# Patient Record
Sex: FEMALE | Race: WHITE
[De-identification: names, ages, dates, MRNs, and addresses within clinical notes are randomized per-mention and may not be internally consistent; named-entity substitution may affect disease eponyms.]

---

## 2020-06-12 ENCOUNTER — HOSPITAL ENCOUNTER (EMERGENCY)
Dept: HOSPITAL 11 - JP.ED | Age: 45
Discharge: HOME | End: 2020-06-12
Payer: COMMERCIAL

## 2020-06-12 DIAGNOSIS — R10.12: Primary | ICD-10-CM

## 2020-06-12 DIAGNOSIS — Z88.6: ICD-10-CM

## 2020-06-12 DIAGNOSIS — Z88.8: ICD-10-CM

## 2020-06-12 DIAGNOSIS — Z88.5: ICD-10-CM

## 2020-06-12 DIAGNOSIS — E03.9: ICD-10-CM

## 2020-06-12 DIAGNOSIS — M54.6: ICD-10-CM

## 2020-06-12 DIAGNOSIS — F41.9: ICD-10-CM

## 2020-06-12 PROCEDURE — 99284 EMERGENCY DEPT VISIT MOD MDM: CPT

## 2020-06-12 PROCEDURE — 36415 COLL VENOUS BLD VENIPUNCTURE: CPT

## 2020-06-12 PROCEDURE — 96374 THER/PROPH/DIAG INJ IV PUSH: CPT

## 2020-06-12 PROCEDURE — 80053 COMPREHEN METABOLIC PANEL: CPT

## 2020-06-12 PROCEDURE — 85025 COMPLETE CBC W/AUTO DIFF WBC: CPT

## 2020-06-12 PROCEDURE — 83605 ASSAY OF LACTIC ACID: CPT

## 2020-06-12 PROCEDURE — 84484 ASSAY OF TROPONIN QUANT: CPT

## 2020-06-12 PROCEDURE — 83690 ASSAY OF LIPASE: CPT

## 2020-06-12 PROCEDURE — 81025 URINE PREGNANCY TEST: CPT

## 2020-06-12 PROCEDURE — 96375 TX/PRO/DX INJ NEW DRUG ADDON: CPT

## 2020-06-12 PROCEDURE — 74177 CT ABD & PELVIS W/CONTRAST: CPT

## 2020-06-12 PROCEDURE — 96361 HYDRATE IV INFUSION ADD-ON: CPT

## 2020-06-12 PROCEDURE — 81001 URINALYSIS AUTO W/SCOPE: CPT

## 2020-06-12 PROCEDURE — 96376 TX/PRO/DX INJ SAME DRUG ADON: CPT

## 2020-06-12 RX ADMIN — Medication PRN ML: at 20:33

## 2020-06-12 RX ADMIN — Medication PRN ML: at 19:02

## 2020-06-12 RX ADMIN — Medication PRN ML: at 19:21

## 2020-06-12 NOTE — EDM.PDOC
ED HPI GENERAL MEDICAL PROBLEM





- General


Chief Complaint: Abdominal Pain


Stated Complaint: LEFT SIDED PAIN


Time Seen by Provider: 20 18:34


Source of Information: Reports: Patient, RN Notes Reviewed


History Limitations: Reports: No Limitations





- History of Present Illness


INITIAL COMMENTS - FREE TEXT/NARRATIVE: 





45-year-old female presents emergency department with a complaint of left upper 

quadrant pain, she states is been going on for 3 days she does have a lot of 

back pain with this does have a history of pancreatitis as well as Sjogren's 

syndrome also history of perforated ulcer with multiple complications, she has 

not had any fevers has had nausea and vomiting no shortness of breath or chest 

pain no problems with bowel movements


  ** back


Pain Score (Numeric/FACES): 7





- Related Data


 Allergies











Allergy/AdvReac Type Severity Reaction Status Date / Time


 


acetaminophen Allergy  Nausea Verified 20 17:46





[From Tylenol-Codeine #3]     


 


codeine Allergy  Nausea Verified 20 17:46





[From Tylenol-Codeine #3]     


 


haloperidol [From Haldol] Allergy  Anxiety Verified 20 17:46


 


metoclopramide [From Reglan] Allergy  Anxiety Verified 20 17:46











Home Meds: 


 Home Meds





Cyanocobalamin (Vitamin B-12) [Cyanocobalamin Injection] 1,000 mcg IM 

ASDIRECTED 20 [History]


Furosemide 40 mg PO DAILY 20 [History]


Hydrocodone/Acetaminophen [Hydrocodon-Acetaminophn ] 1 tab PO QID PRN  [History]


QUEtiapine [SEROquel] 400 mg PO BEDTIME 20 [History]


Scopolamine 1 each TD ASDIRECTED 20 [History]











Past Medical History


HEENT History: Reports: Other (See Below)


Other HEENT History: blind right eye


Gastrointestinal History: Reports: Other (See Below)


Other Gastrointestinal History: bowel perforation, recurrent pancreatitis


Genitourinary History: Reports: UTI, Recurrent


OB/GYN History: Reports: Pregnancy


Musculoskeletal History: Reports: Other (See Below)


Other Musculoskeletal History: Sjogren's


Psychiatric History: Reports: Anxiety, PTSD


Endocrine/Metabolic History: Reports: Hypothyroidism


Immunologic History: Reports: Other (See Below)


Other Immunologic History: Sjogren's





- Past Surgical History


GI Surgical History: Reports: Bariatric Procedure, Cholecystectomy, Esophageal 

Dilatation


Female  Surgical History: Reports:  Section





Social & Family History





- Tobacco Use


Smoking Status *Q: Never Smoker





- Recreational Drug Use


Recreational Drug Use: No





ED ROS GENERAL





- Review of Systems


Review Of Systems: See Below


Constitutional: Denies: Fever, Chills


HEENT: Reports: No Symptoms


Respiratory: Reports: No Symptoms


Cardiovascular: Reports: No Symptoms


GI/Abdominal: Reports: Abdominal Pain, Nausea, Vomiting.  Denies: Constipation, 

Diarrhea


: Reports: No Symptoms


Musculoskeletal: Reports: No Symptoms





ED EXAM, GI/ABD





- Physical Exam


Exam: See Below


Exam Limited By: No Limitations


General Appearance: Alert, WD/WN, No Apparent Distress


Neck: Normal Inspection, Supple, Non-Tender, Full Range of Motion


Respiratory/Chest: No Respiratory Distress, Lungs Clear, Normal Breath Sounds, 

No Accessory Muscle Use, Chest Non-Tender


Cardiovascular: Regular Rate, Rhythm, No Murmur


GI/Abdominal Exam: Soft, No Organomegaly, No Distention, No Abnormal Bruit, 

Tender (Left upper quadrant)


Extremities: No Pedal Edema





Course





- Vital Signs


Last Recorded V/S: 


 Last Vital Signs











Temp  96.9 F   20 17:51


 


Pulse  68   20 19:05


 


Resp  16   20 19:05


 


BP  98/62   20 19:05


 


Pulse Ox  98   20 19:05














- Orders/Labs/Meds


Orders: 


 Active Orders 24 hr











 Category Date Time Status


 


 Peripheral IV Care [RC] .AS DIRECTED Care  20 18:32 Active


 


 Iopamidol [Isovue-300 (61%)] Med  20 19:15 Active





 100 ml IV .AS DIRECTED   


 


 Lactated Ringers [Ringers, Lactated] 1,000 ml Med  20 18:45 Active





 IV ASDIRECTED   


 


 Sodium Chloride 0.9% [Normal Saline] 80 ml Med  20 19:15 Active





 IV ASDIRECTED   


 


 Sodium Chloride 0.9% [Saline Flush] Med  20 18:31 Active





 10 ml FLUSH ASDIRECTED PRN   


 


 Peripheral IV Insertion Adult [OM.PC] Urgent Oth  20 18:31 Ordered








 Medication Orders





Lactated Ringer's (Ringers, Lactated)  1,000 mls @ 999 mls/hr IV ASDIRECTED [facility]


   Last Admin: 20 19:02  Dose: 999 mls/hr


Sodium Chloride (Normal Saline)  80 mls @ 3 mls/sec IV ASDIRECTED ANTONINO


   Last Admin: 20 19:21  Dose: 3 mls/sec


Iopamidol (Isovue-300 (61%))  100 ml IV .AS DIRECTED ANTONINO


   Last Admin: 20 19:21  Dose: 100 ml


Sodium Chloride (Saline Flush)  10 ml FLUSH ASDIRECTED PRN


   PRN Reason: Keep Vein Open


   Last Admin: 20 19:21  Dose: 10 ml


   Admin: 20 19:02  Dose: 10 ml








Labs: 


 Laboratory Tests











  20 Range/Units





  17:55 18:31 18:31 


 


WBC   4.7   (4.5-11.0)  K/uL


 


RBC   3.75   (3.30-5.50)  M/uL


 


Hgb   11.7 L   (12.0-15.0)  g/dL


 


Hct   36.1   (36.0-48.0)  %


 


MCV   96   (80-98)  fL


 


MCH   31   (27-31)  pg


 


MCHC   32   (32-36)  %


 


Plt Count   273   (150-400)  K/uL


 


Neut % (Auto)   46   (36-66)  %


 


Lymph % (Auto)   45 H   (24-44)  %


 


Mono % (Auto)   7 H   (2-6)  %


 


Eos % (Auto)   1 L   (2-4)  %


 


Baso % (Auto)   1   (0-1)  %


 


Sodium    141  (140-148)  mmol/L


 


Potassium    3.5 L  (3.6-5.2)  mmol/L


 


Chloride    104  (100-108)  mmol/L


 


Carbon Dioxide    29  (21-32)  mmol/L


 


Anion Gap    11.5  (5.0-14.0)  mmol/L


 


BUN    17  (7-18)  mg/dL


 


Creatinine    0.9  (0.6-1.0)  mg/dL


 


Est Cr Clr Drug Dosing    69.60  mL/min


 


Estimated GFR (MDRD)    > 60  (>60)  


 


Glucose    96  ()  mg/dL


 


Lactic Acid     (0.4-2.0)  mmol/L


 


Calcium    8.6  (8.5-10.1)  mg/dL


 


Total Bilirubin    0.3  (0.2-1.0)  mg/dL


 


AST    24  (15-37)  U/L


 


ALT    27  (12-78)  U/L


 


Alkaline Phosphatase    141 H  ()  U/L


 


Troponin I    < 0.017  (0.000-0.056)  ng/mL


 


Total Protein    7.0  (6.4-8.2)  g/dL


 


Albumin    3.3 L  (3.4-5.0)  g/dL


 


Globulin    3.7 H  (2.3-3.5)  g/dL


 


Albumin/Globulin Ratio    0.9 L  (1.2-2.2)  


 


Lipase  78    ()  U/L


 


Urine Color     (YELLOW)  


 


Urine Appearance     (CLEAR)  


 


Urine pH     (5.0-8.0)  


 


Ur Specific Gravity     (1.008-1.030)  


 


Urine Protein     (NEGATIVE)  mg/dL


 


Urine Glucose (UA)     (NEGATIVE)  mg/dL


 


Urine Ketones     (NEGATIVE)  mg/dL


 


Urine Occult Blood     (NEGATIVE)  


 


Urine Nitrite     (NEGATIVE)  


 


Urine Bilirubin     (NEGATIVE)  


 


Urine Urobilinogen     (0.2-1.0)  EU/dL


 


Ur Leukocyte Esterase     (NEGATIVE)  


 


Urine RBC     (0-5)  


 


Urine WBC     (0-5)  


 


Ur Epithelial Cells     


 


Amorphous Sediment     


 


Urine Bacteria     


 


Urine Mucus     


 


Urine HCG, Qual     














  20 Range/Units





  18:31 18:40 18:40 


 


WBC     (4.5-11.0)  K/uL


 


RBC     (3.30-5.50)  M/uL


 


Hgb     (12.0-15.0)  g/dL


 


Hct     (36.0-48.0)  %


 


MCV     (80-98)  fL


 


MCH     (27-31)  pg


 


MCHC     (32-36)  %


 


Plt Count     (150-400)  K/uL


 


Neut % (Auto)     (36-66)  %


 


Lymph % (Auto)     (24-44)  %


 


Mono % (Auto)     (2-6)  %


 


Eos % (Auto)     (2-4)  %


 


Baso % (Auto)     (0-1)  %


 


Sodium     (140-148)  mmol/L


 


Potassium     (3.6-5.2)  mmol/L


 


Chloride     (100-108)  mmol/L


 


Carbon Dioxide     (21-32)  mmol/L


 


Anion Gap     (5.0-14.0)  mmol/L


 


BUN     (7-18)  mg/dL


 


Creatinine     (0.6-1.0)  mg/dL


 


Est Cr Clr Drug Dosing     mL/min


 


Estimated GFR (MDRD)     (>60)  


 


Glucose     ()  mg/dL


 


Lactic Acid  0.7    (0.4-2.0)  mmol/L


 


Calcium     (8.5-10.1)  mg/dL


 


Total Bilirubin     (0.2-1.0)  mg/dL


 


AST     (15-37)  U/L


 


ALT     (12-78)  U/L


 


Alkaline Phosphatase     ()  U/L


 


Troponin I     (0.000-0.056)  ng/mL


 


Total Protein     (6.4-8.2)  g/dL


 


Albumin     (3.4-5.0)  g/dL


 


Globulin     (2.3-3.5)  g/dL


 


Albumin/Globulin Ratio     (1.2-2.2)  


 


Lipase     ()  U/L


 


Urine Color   Orange A   (YELLOW)  


 


Urine Appearance   Slightly cloudy A   (CLEAR)  


 


Urine pH   5.5   (5.0-8.0)  


 


Ur Specific Gravity   >= 1.030   (1.008-1.030)  


 


Urine Protein   Negative   (NEGATIVE)  mg/dL


 


Urine Glucose (UA)   Negative   (NEGATIVE)  mg/dL


 


Urine Ketones   Negative   (NEGATIVE)  mg/dL


 


Urine Occult Blood   Negative   (NEGATIVE)  


 


Urine Nitrite   Negative   (NEGATIVE)  


 


Urine Bilirubin   Small H   (NEGATIVE)  


 


Urine Urobilinogen   0.2   (0.2-1.0)  EU/dL


 


Ur Leukocyte Esterase   Negative   (NEGATIVE)  


 


Urine RBC   0-5   (0-5)  


 


Urine WBC   0-5   (0-5)  


 


Ur Epithelial Cells   Many   


 


Amorphous Sediment   Few   


 


Urine Bacteria   Not seen   


 


Urine Mucus   Not seen   


 


Urine HCG, Qual    Negative  











Meds: 


Medications











Generic Name Dose Route Start Last Admin





  Trade Name Freq  PRN Reason Stop Dose Admin


 


Lactated Ringer's  1,000 mls @ 999 mls/hr  20 18:45  20 19:02





  Ringers, Lactated  IV   999 mls/hr





  ASDIRECTED ANTONINO   Administration





     





     





     





     


 


Sodium Chloride  80 mls @ 3 mls/sec  20 19:15  20 19:21





  Normal Saline  IV   3 mls/sec





  ASDIRECTED ANTONINO   Administration





     





     





     





     


 


Iopamidol  100 ml  20 19:15  20 19:21





  Isovue-300 (61%)  IV   100 ml





  .AS DIRECTED ANTONINO   Administration





     





     





     





     


 


Sodium Chloride  10 ml  20 18:31  20 19:21





  Saline Flush  FLUSH   10 ml





  ASDIRECTED PRN   Administration





  Keep Vein Open   





     





     





     














Discontinued Medications














Generic Name Dose Route Start Last Admin





  Trade Name Marian  PRN Reason Stop Dose Admin


 


Fentanyl  50 mcg  20 18:53  20 19:01





  Sublimaze  IVPUSH  20 18:54  50 mcg





  ONETIME ONE   Administration





     





     





     





     


 


Hydromorphone HCl  1 mg  20 19:30  20 19:38





  Dilaudid  IVPUSH  20 19:31  1 mg





  ONETIME ONE   Administration





     





     





     





     














Departure





- Departure


Time of Disposition: 20:10


Disposition: Home, Self-Care 01


Condition: Fair


Clinical Impression: 


Abdominal pain


Qualifiers:


 Abdominal location: left upper quadrant Qualified Code(s): R10.12 - Left upper 

quadrant pain





Back pain


Qualifiers:


 Back pain location: thoracic back pain Chronicity: acute Back pain laterality: 

left Qualified Code(s): M54.6 - Pain in thoracic spine








- Discharge Information


Instructions:  Abdominal Pain, Adult, Easy-to-Read, Acute Back Pain, Adult


Referrals: 


PCP,None [Primary Care Provider] - 


Forms:  ED Department Discharge


Additional Instructions: 


Continue to use your pain medications at home at the [facility] as needed for 

increased back pain,  please followup with your primary care provider in 3-5 

days if not better, please call return to the emergency department with 

worsening of symptoms.





Sepsis Event Note (ED)





- Evaluation


Sepsis Screening Result: No Definite Risk





- Focused Exam


Vital Signs: 


 Vital Signs











  Temp Pulse Resp BP Pulse Ox


 


 20 19:05   68  16  98/62  98


 


 20 17:51  96.9 F  90  16  117/75  96


 


 20 17:39  96.9 F  90  16  117/75  96














- My Orders


Last 24 Hours: 


My Active Orders





20 18:31


Sodium Chloride 0.9% [Saline Flush]   10 ml FLUSH ASDIRECTED PRN 


Peripheral IV Insertion Adult [OM.PC] Urgent 





20 18:32


Peripheral IV Care [RC] .AS DIRECTED 





20 18:45


Lactated Ringers [Ringers, Lactated] 1,000 ml IV ASDIRECTED 





20 19:15


Iopamidol [Isovue-300 (61%)]   100 ml IV .AS DIRECTED 


Sodium Chloride 0.9% [Normal Saline] 80 ml IV ASDIRECTED 














- Assessment/Plan


Last 24 Hours: 


My Active Orders





20 18:31


Sodium Chloride 0.9% [Saline Flush]   10 ml FLUSH ASDIRECTED PRN 


Peripheral IV Insertion Adult [OM.PC] Urgent 





20 18:32


Peripheral IV Care [RC] .AS DIRECTED 





20 18:45


Lactated Ringers [Ringers, Lactated] 1,000 ml IV ASDIRECTED 





20 19:15


Iopamidol [Isovue-300 (61%)]   100 ml IV .AS DIRECTED 


Sodium Chloride 0.9% [Normal Saline] 80 ml IV ASDIRECTED 











Plan: 





Assessment





Acuity = acute





Site and laterality = abdominal pain with back pain





Etiology  = probably muscle skeletal





Manifestations = none





Location of injury =  Home





Lab values = CBC, CMP, lipase, urinalysis all unremarkable CT scan shows no 

acute process





Plan


She had good relief combination fentanyl and hydromorphone, plan is discharged 

home she does have hydrocodone at home I did add Flexeril 1 tab p.o. 3 times 

daily PRN total #15 she will follow-up with her primary care upon return home

















 This note was dictated using dragon voice recognition software please call 

with any questions on syntax or grammar.

## 2020-06-12 NOTE — CRLCT
INDICATION:



Left upper quadrant pain 



TECHNIQUE:



CT abdomen and pelvis acquired with 100 cc Isovue-300 IV contrast.



COMPARISON:



None  



FINDINGS:



Lower chest: Trace left pleural effusion. 



Liver: Unremarkable.  



Spleen: Unremarkable.  



Pancreas: Unremarkable.  



Gallbladder and bile ducts: S/p cholecystectomy. 



Adrenal glands: Unremarkable.  



Kidneys: Unremarkable.  



GI tract: Status post gastric bypass procedure. Large amount of feces in 

the colon. 



Vascular structures: Unremarkable.  



Lymph nodes: Unremarkable.  



Miscellaneous: Unremarkable.  No free air or significant free fluid.  



Pelvic Organs: Unremarkable.  



Bones: Unremarkable for age.  



IMPRESSION:



No acute intra-abdominal inflammatory process identified. 



Constipation.



Trace left pleural effusion.



Status post gastric bypass procedure and cholecystectomy.



Please note that all CT scans at this facility use dose modulation, 

iterative reconstruction, and/or weight-based dosing when appropriate to 

reduce radiation dose to as low as reasonably achievable.



Dictated by Chelsy Granger MD @ Jun 12 2020  7:59PM



Signed by Dr. Chelsy Granger @ Jun 12 2020  8:03PM